# Patient Record
Sex: MALE | Race: WHITE | Employment: OTHER | URBAN - METROPOLITAN AREA
[De-identification: names, ages, dates, MRNs, and addresses within clinical notes are randomized per-mention and may not be internally consistent; named-entity substitution may affect disease eponyms.]

---

## 2019-01-27 LAB — COLONOSCOPY, EXTERNAL: NORMAL

## 2021-06-16 ENCOUNTER — HOSPITAL ENCOUNTER (OUTPATIENT)
Dept: PHYSICAL THERAPY | Age: 76
Discharge: HOME | End: 2021-06-16
Payer: COMMERCIAL

## 2021-06-16 PROCEDURE — 95992 CANALITH REPOSITIONING PROC: CPT

## 2021-06-16 PROCEDURE — 97162 PT EVAL MOD COMPLEX 30 MIN: CPT

## 2021-06-16 NOTE — PROGRESS NOTES
[address] [facility] PHYSICAL THERAPY AT [facility] [address] [place], [address] [facility] [address] [facility] [address] - Phone: [phone]  Fax: [phone] / [address] [facility]  Patient Name: Alexandr Pelaez : 5425   Medical   Diagnosis: Vertigo [R42] Treatment Diagnosis: Vertigo/ imbalance   Onset Date: Early 2021     Referral Source: Zaki Perdue* Start of Care [facility]): 2021   Prior Hospitalization: See medical history Provider #: [id number]   Prior Level of Function: Prior episode of dizziness Fall    Comorbidities: bilat TKR, Parkinson's disease, arthritis, pacemaker, hearing impaires   Medications: Verified on Patient Summary List     The Plan of Care and following information is based on the information from the initial evaluation.     ==============================================================  Assessment / key information:   Alexandr Pelaez is a 70 y.o.  yo male with Dx of Vertigo [R42]/ imbalance. He reports severe onset of dizziness with imbalance that began approx 2-3 weeks ago but has since subsided. Initial symptoms were of severe dizziness which lasted 3-4 hours. He reports residual symptoms of imbalance that lasted approx 1 week. Symptoms are reported as minimal at this time. Objectively, the patient demonstrates  the following:  Balance: Decreased static balance: SLS: R= 4 sec, L= 3 sec, ambulatory balance assessed via Functional Gait Assessment to be completed at next visit   Dizziness with ADLs: Dizziness Handicap Inventory ([address] [place] [address] = 0 points). Oculo-motor tests are WNLs     BPPV Assessment: Hallpike-[place] (right):+Hallpike[place] (left)-Roll Test (right):-Roll Test (left): -.  Pt will benefit from PT/Vestibular rehab to address these deficits, improve functional independence and reduce dizziness and imbalance with normal daily activities.  Thank you for this referral. ===============================================================  Eval Complexity: History MEDIUM  Complexity : 1-2 comorbidities / personal factors will impact the outcome/ POC ;  Examination  HIGH Complexity : 4+ Standardized tests and measures addressing body structure, function, activity limitation and / or participation in recreation ; Presentation LOW Complexity : Stable, uncomplicated ;  Decision Making MEDIUM Complexity : FOTO score of 26-74; Overall Complexity LOW   Problem List: impaired gait/ balance, decrease ADL/ functional abilitiies, decrease activity tolerance and decrease transfer abilities   Treatment Plan may include any combination of the following: Therapeutic exercise, Therapeutic activities, Neuromuscular re-education, Gait/balance training and Patient education  Patient / Family readiness to learn indicated by: asking questions, trying to perform skills and interest  Persons(s) to be included in education: patient (P)  Barriers to Learning/Limitations: None  Measures taken, if barriers to learning:    Patient Goal (s): Control of vertigo   Self reported health status: good  Rehabilitation Potential: excellent   Short/ Long Term Goals: To be accomplished in 4-6  treatments:  1. Patient will report at least 50% reduction of symptoms with ADLs. 2. Patient will be independent and complaint with HEP TID to reduce imbalance and dizziness with ADLs. 3. FGA will improve by greater that 4 points  to demonstrate reduction of dizziness and imbalance with ADLs.     Frequency / Duration:   Patient to be seen  2  times per week for 2-4  weeks:  Patient / Caregiver education and instruction: self care, activity modification and exercises    Therapist Signature: Domingo Chacon PT Date: 8/71/0370   Certification Period: 6/16/21 tp 9/18/21 Time: 1:48 PM   ==================================================================  I certify that the above Physical Therapy Services are being furnished while the patient is under my care. I agree with the treatment plan and certify that this therapy is necessary. Physician Signature:        Date:       Time:       Alexandra Salazar*  Please sign and return to [facility] or you may fax the signed copy to [phone]. Thank you.

## 2021-06-16 NOTE — PROGRESS NOTES
PHYSICAL THERAPY - DAILY TREATMENT NOTE     Patient Name: Halina Junior        Date: 2021  : 1945   YES Patient  Verified  Visit #:   1   of     Insurance: Payor: [address] / Plan: [employer] / Product Type: Managed Care Medicare /      In time: 3946 Out time: 110   Total Treatment Time: 40     Medicare/BCBS Time Tracking (below)   Total Timed Codes (min):  15 1:1 Treatment Time:  15     TREATMENT AREA =  Vertigo [R42]    SUBJECTIVE    Pain Level (on 0 to 10 scale):  0  / 10   Medication Changes/New allergies or changes in medical history, any new surgeries or procedures?     NO    If yes, update Summary List   Subjective Functional Status/Changes:  []  No changes reported     See eval /POC         OBJECTIVE  Modalities Rationale:     decrease pain to improve patient's ability to return to PLOF      min [] Estim, type/location:                                      []  att     []  unatt     []  w/US     []  w/ice    []  w/heat    min []  Mechanical Traction: type/lbs                   []  pro   []  sup   []  int   []  cont    []  before manual    []  after manual    min []  Ultrasound, settings/location:      min []  Iontophoresis w/ dexamethasone, location:                                               []  take home patch       []  in clinic    min []  Ice     []  Heat    location/position:     min []  Vasopneumatic Device, press/temp:     min []  Other:    [] Skin assessment post-treatment (if applicable):    []  intact    []  redness- no adverse reaction     []redness  adverse reaction:       min Therapeutic Exercise:  [x]  See flow sheet   Rationale:      increase ROM and increase strength to improve the patients ability to return to PLOF     15 min Manual Therapy: Technique:      [] S/DTM []IASTM []PROM [] Passive Stretching   []manual TPR    []Jt manipulation:Gr I [] II []  III [] IV[]  []REpley  Treatment Area:vestibular     Rationale: decrease pain, increase ROM, increase tissue extensibility and decrease trigger points to improve patient's ability to return to PLOF     min Neuromuscular Re-ed: [x]  See flow sheet   Rationale:      improve coordination, improve balance, increase proprioception and dec dizziness to improve the patients ability to return to PLOF        min Self Care:    Rationale:    increase ROM, increase strength and improve coordination to improve the patients ability to return to PLOF    Billed With/As:   [] TE   [] TA   [] Neuro   [] Self Care Patient Education: [x] Review HEP    [] Progressed/Changed HEP based on:   [] positioning   [] body mechanics   [] transfers   [] heat/ice application    [] other:        Other Objective/Functional Measures:    See eval/ POC     Post Treatment Pain Level (on 0 to 10) scale:   0  / 10     ASSESSMENT    X  See POC     PLAN    [x]  Upgrade activities as tolerated {YES) Continue plan of care   []  Discharge due to :    []  Other:      Therapist: Jose A Rubin PT    Date: 6/16/2021 Time: 1:42 PM     Future Appointments   Date Time Provider Madelin Nieto   6/22/2021 10:00 AM Karen Bledsoe PTA [facility]   6/25/2021 10:15 AM Monico Rhoades PT [facility]

## 2021-06-22 ENCOUNTER — HOSPITAL ENCOUNTER (OUTPATIENT)
Dept: PHYSICAL THERAPY | Age: 76
Discharge: HOME | End: 2021-06-22
Payer: COMMERCIAL

## 2021-06-22 PROCEDURE — 97112 NEUROMUSCULAR REEDUCATION: CPT

## 2021-06-22 NOTE — PROGRESS NOTES
PHYSICAL THERAPY - DAILY TREATMENT NOTE     Patient Name: Gail Robles        Date: 2021  : 1945   YES Patient  Verified  Visit #:  2  of     Insurance: Payor: Abelino Haskins / Plan: [address] PPO / Product Type: PPO /      In time: 1008 Out time: 6297   Total Treatment Time: 34     Medicare/BCBS Time Tracking (below)   Total Timed Codes (min):  34 1:1 Treatment Time:  34     TREATMENT AREA =  Vertigo [R42]    SUBJECTIVE    Pain Level (on 0 to 10 scale): 0 / 10   Medication Changes/New allergies or changes in medical history, any new surgeries or procedures? NO    If yes, update Summary List   Subjective Functional Status/Changes:  []  No changes reported       Functional improvements: no sx @ present  Functional impairments:dizziness the first 10-15 min when getting up in the morning. \"I feel like I am leaning to the left. \"         OBJECTIVE      29 min Neuromuscular Re-ed: [x]  See flow sheet   Rationale:      improve balance and increase proprioception to improve the patients ability to decrease imbalance in ADLs    5 min Manual Therapy: Technique:      [] S/DTM []IASTM []PROM [] Passive Stretching   []manual TPR    []Jt manipulation:Gr I [] II []  III [] IV[] V[]  Treatment Area:  Negative Hallpike bilaterally   Rationale:      correct positional vertigo to improve patient's ability to resolve vertigo  The manual therapy interventions were performed at a separate and distinct time from the therapeutic activities interventions.       Billed With/As:   [] TE   [] TA   [] Neuro   [] Self Care Patient Education: [x] Review HEP    [] Progressed/Changed HEP based on:   [] positioning   [] body mechanics   [] transfers   [] heat/ice application    [] other:        Other Objective/Functional Measures:    FGA:25/30  Add VSE sit, static balance EO/EC     Post Treatment Pain Level (on 0 to 10) scale:   0 / 10     ASSESSMENT    Assessment/Changes in Function:   Negative Mathew noted bilaterally   FGA baseline established  Pt verbalized good understanding of written HEP after instruction. []  See Progress Note/Recertification   Patient will continue to benefit from skilled PT services to modify and progress therapeutic interventions, address functional mobility deficits, address ROM deficits, address strength deficits, analyze and address soft tissue restrictions, analyze and cue movement patterns, address imbalance/dizziness and instruct in home and community integration to attain remaining goals.       Progress toward goals / Updated goals:    Good Progress to    [x] STG    [] LTG  1 as shown by initial HEP established per POC          PLAN    [x]  Upgrade activities as tolerated YES Continue plan of care   []  Discharge due to :    []  Other:      Therapist: Rusty Luna PTA    Date: 6/22/2021 Time: 10:42 AM     Future Appointments   Date Time Provider Madelin Nieto   6/25/2021 10:15 AM Jana Bojorquez, PT [facility]

## 2021-06-25 ENCOUNTER — HOSPITAL ENCOUNTER (OUTPATIENT)
Dept: PHYSICAL THERAPY | Age: 76
Discharge: HOME | End: 2021-06-25
Payer: COMMERCIAL

## 2021-06-25 PROCEDURE — 97112 NEUROMUSCULAR REEDUCATION: CPT

## 2021-06-25 NOTE — PROGRESS NOTES
PHYSICAL THERAPY - DAILY TREATMENT NOTE    Patient Name: Brigid Chimera        Date: 2021  : 1945    Patient  Verified: YES  Visit #:   3   of   12  Insurance: Payor: Diallo Schultz / Plan: [facility] [employer] PPO / Product Type: PPO /      In time: 1:35 Out time: 2:05   Total Treatment Time: 40     Medicare Time Tracking (below)   Total Timed Codes (min):  40 1:1 Treatment Time:  40     TREATMENT AREA/ DIAGNOSIS = Vertigo [R42]    SUBJECTIVE  Pain Level (on 0 to 10 scale):  0  / 10   Medication Changes/New allergies or changes in medical history, any new surgeries or procedures? NO    If yes, update Summary List   Subjective Functional Status/Changes:  []  No changes reported     Pt reports dizziness only in the morning for the first 5min after getting out of bed. Pt states he feels like he is leaning to the left       OBJECTIVE        40 min Neuromuscular Re-ed: [x]  See flow sheet   Rationale:    improve balance and increase proprioception to improve the patients ability to perform ADLs without pain      Billed With/As:   [x] TE   [] TA   [x] Neuro   [] Self Care Patient Education: [x] Review HEP    [] Progressed/Changed HEP based on:   [] positioning   [] body mechanics   [] transfers   [] heat/ice application    [] other:        Other Objective/Functional Measures:    Great static balance except for SLS <10secs   Post Treatment Pain Level (on 0 to 10) scale:   0  / 10     ASSESSMENT  Assessment/Changes in Function:     Pt show tremor in R hand with rest and during activity.  Pt had slight difficulty only a few times with grapevine and tandem walking      []  See Progress Note/Recertification   Patient will continue to benefit from skilled PT services to modify and progress therapeutic interventions, address functional mobility deficits, address ROM deficits, address strength deficits, analyze and address soft tissue restrictions and analyze and cue movement patterns to attain remaining goals.    Progress toward goals / Updated goals:    Good Progress to    [] STG    [x] LTG  1 as shown by no imbalance with most exercises today     PLAN  [x]  Upgrade activities as tolerated YES Continue plan of care   []  Discharge due to :    []  Other:      Therapist: Lavonne Galvin DPT     Date: 6/25/2021 Time: 2:41 PM        Future Appointments   Date Time Provider Madelin Nieto   7/1/2021 12:00 PM Mayi Morris, PT [facility]

## 2021-07-01 ENCOUNTER — HOSPITAL ENCOUNTER (OUTPATIENT)
Dept: PHYSICAL THERAPY | Age: 76
Discharge: HOME | End: 2021-07-01
Payer: COMMERCIAL

## 2021-07-01 PROCEDURE — 97112 NEUROMUSCULAR REEDUCATION: CPT

## 2021-07-01 NOTE — PROGRESS NOTES
PHYSICAL THERAPY - DAILY TREATMENT NOTE     Patient Name: Ghazala Luu        Date: 2021  : 1945   YES Patient  Verified  Visit #:   4   of   -  Insurance: Payor: Thomascandice Shelley / Plan: [address] PPO / Product Type: PPO /      In time: 1155 Out time: 1230   Total Treatment Time: 35     Medicare/BCBS Time Tracking (below)   Total Timed Codes (min):  35 1:1 Treatment Time:  35     TREATMENT AREA =  Vertigo [R42]    SUBJECTIVE    Pain Level (on 0 to 10 scale):  0  / 10   Medication Changes/New allergies or changes in medical history, any new surgeries or procedures? NO    If yes, update Summary List   Subjective Functional Status/Changes:  []  No changes reported   No recent spells of dizziness. Mornings have been good.     Balance has been pretty  Good    Overall- 75-80% improvement           OBJECTIVE        35 min Neuromuscular Re-ed: [x]  See flow sheet   Rationale:      improve coordination, improve balance, increase proprioception and dec dizziness to improve the patients ability to perform ADLs           Billed With/As:   [] TE   [] TA   [] Neuro   [] Self Care Patient Education: [x] Review HEP    [] Progressed/Changed HEP based on:   [] positioning   [] body mechanics   [] transfers   [] heat/ice application    [] other:        Other Objective/Functional Measures:    See PN/ DC   Post Treatment Pain Level (on 0 to 10) scale:   0  / 10     ASSESSMENT    [x]  See Progress Note/Recertification    PLAN    [x]  Upgrade activities as tolerated {YES) Continue plan of care   []  Discharge due to :    []  Other:      Therapist: Roxie Espino PT    Date: 2021 Time: 11:52 AM     Future Appointments   Date Time Provider Madelin Nieto   2021 12:00 PM Charan Day PT [facility]

## 2021-07-01 NOTE — PROGRESS NOTES
[address] [facility] PHYSICAL THERAPY AT [address] [facility], [address] [place] [address], [address], [address] [place] [address] - Phone: [phone]  Fax: [phone] THERAPY          Patient Name: Ramirez Field :    Treatment/Medical Diagnosis: Vertigo [R42]   Onset Date: Early 2021    Referral Source: Lora Anderson* Start of Care [facility]): 21   Prior Hospitalization: See Medical History Provider #: [id number]   Prior Level of Function: Prior episode of dizziness Fall    Comorbidities: bilat TKR, Parkinson's disease, arthritis, pacemaker, hearing impaires   Medications: Verified on Patient Summary List   Visits from [facility]: 4 Missed Visits: -     Previous Goals:  1. Patient will report at least 50% reduction of symptoms with ADLs. 2. Patient will be independent and complaint with HEP TID to reduce imbalance and dizziness with ADLs. 3. FGA will improve by greater that 4 points  to demonstrate reduction of dizziness and imbalance with ADLs. 4. Negative [place]- Hallpike test     Prior Level/Current Level:  1) Prior Level: na   Current Level: 75-80%   Goal Met? yes  2) Prior Level: na   Current Level: indep with current HEP/ self maintenance   Goal Met? yes  3) Prior Level: 25/30   Current Level: 28/30   Goal Met? yes, progressing  4) Prior Level: +   Current Level: -   Goal Met? yes    Key Functional Changes/Progress: Patient has progressed well through this course of PT. He reports 75-80% overall improvement in symptoms since onset/  FOTO score has increased from 34% to 86%, indicating significant improvement in function. He reports no recent episodes of dizziness or loss of balance    Assessments/Recommendations: Will place PT on hold for 30 days. Patient has been instructed to return to PT if symptoms worsen. He will continue with his HEP. If patient does not return to PT by 2021, he will be discharged from care.   If you have any questions/comments please contact us directly at [phone]. Thank you for allowing us to assist in the care of your patient. Therapist Signature: Sharon Solorzano PT Date: 8/0/4885   Certification Period:  Reporting Period: 6/16/21 to 9/18/21 6/16/21 to 7/1/21 Time: 12:33 PM   NOTE TO PHYSICIAN:  PLEASE COMPLETE THE ORDERS BELOW AND FAX TO   [facility] at [address]: [phone]. If you are unable to process this request in 24 hours please contact our office: [phone].    ___ I have read the above report and request that my patient continue as recommended.   ___ I have read the above report and request that my patient continue therapy with the following changes/special instructions: ________________________________________________   ___ I have read the above report and request that my patient be discharged from therapy. Physician Signature:        Date:       Time:    Sanam Hernandez*.

## 2021-08-23 NOTE — PROGRESS NOTES
[address] [facility]  [address] [facility] PHYSICAL THERAPY AT [address] [facility], [address] [place] [address] Way, [address], [address] [place] [address] - Phone: [phone]  Fax: [phone]  DISCHARGE- PHYSICAL THERAPY                   Patient Name: Ritesh Sewell : 780   Treatment/Medical Diagnosis: Vertigo [R42]   Onset Date: Early 2021     Referral Source: Leandroara Rough* Start of Care [facility]): 21   Prior Hospitalization: See Medical History Provider #: [id number]   Prior Level of Function: Prior episode of dizziness Fall    Comorbidities: bilat TKR, Parkinson's disease, arthritis, pacemaker, hearing impaires   Medications: Verified on Patient Summary List   Visits from [facility]: 4 Missed Visits: -       Previous Goals:  1. Patient will report at least 50% reduction of symptoms with ADLs. 2. Patient will be independent and complaint with HEP TID to reduce imbalance and dizziness with ADLs. 3. FGA will improve by greater that 4 points  to demonstrate reduction of dizziness and imbalance with ADLs. 4. Negative [place]- Hallpike test                    Prior Level/Current Level:  1) Prior Level: na                 Current Level: 75-80%                 Goal Met? yes  2) Prior Level: na                 Current Level: indep with current HEP/ self maintenance                 Goal Met? yes  3) Prior Level: 25/30                 Current Level: 28/30                 Goal Met? yes, progressing  4) Prior Level: +                 Current Level: -                 Goal Met? yes     Key Functional Changes/Progress: Patient has progressed well through this course of PT. He reports 75-80% overall improvement in symptoms since onset/  FOTO score has increased from 34% to 86%, indicating significant improvement in function. He reports no recent episodes of dizziness or loss of balance     Assessments/Recommendations: Patient did not return to PT during 30 day hold period.   Will discharge from PT  If you have any questions/comments please contact us directly at [phone].    Thank you for allowing us to assist in the care of your patient.     Therapist Signature: Keri Mercer, PT Date: 9/11/6972   Certification Period:  Reporting Period: 6/16/21 to 9/18/21 6/16/21 to 7/1/21 Time: 12:41 PM

## 2022-06-30 PROBLEM — I49.5 SICK SINUS SYNDROME (HCC): Status: ACTIVE | Noted: 2022-06-30

## 2022-06-30 PROBLEM — Z95.0 CARDIAC PACEMAKER IN SITU: Status: ACTIVE | Noted: 2022-06-30

## 2023-06-05 SDOH — HEALTH STABILITY: PHYSICAL HEALTH: ON AVERAGE, HOW MANY DAYS PER WEEK DO YOU ENGAGE IN MODERATE TO STRENUOUS EXERCISE (LIKE A BRISK WALK)?: 4 DAYS

## 2023-06-05 SDOH — HEALTH STABILITY: PHYSICAL HEALTH: ON AVERAGE, HOW MANY MINUTES DO YOU ENGAGE IN EXERCISE AT THIS LEVEL?: 90 MIN

## 2023-06-07 ENCOUNTER — OFFICE VISIT (OUTPATIENT)
Facility: CLINIC | Age: 78
End: 2023-06-07
Payer: COMMERCIAL

## 2023-06-07 VITALS
BODY MASS INDEX: 23.8 KG/M2 | HEART RATE: 50 BPM | HEIGHT: 71 IN | RESPIRATION RATE: 17 BRPM | TEMPERATURE: 98 F | SYSTOLIC BLOOD PRESSURE: 136 MMHG | WEIGHT: 170 LBS | OXYGEN SATURATION: 96 % | DIASTOLIC BLOOD PRESSURE: 74 MMHG

## 2023-06-07 DIAGNOSIS — K22.719 BARRETT'S ESOPHAGUS WITH DYSPLASIA: ICD-10-CM

## 2023-06-07 DIAGNOSIS — R73.03 PREDIABETES: ICD-10-CM

## 2023-06-07 DIAGNOSIS — Z13.6 SCREENING FOR AAA (ABDOMINAL AORTIC ANEURYSM): ICD-10-CM

## 2023-06-07 DIAGNOSIS — G20 PARKINSON'S DISEASE (HCC): ICD-10-CM

## 2023-06-07 DIAGNOSIS — E78.5 HYPERLIPIDEMIA, UNSPECIFIED HYPERLIPIDEMIA TYPE: ICD-10-CM

## 2023-06-07 DIAGNOSIS — I49.5 SICK SINUS SYNDROME (HCC): ICD-10-CM

## 2023-06-07 DIAGNOSIS — Z76.89 ENCOUNTER TO ESTABLISH CARE: Primary | ICD-10-CM

## 2023-06-07 DIAGNOSIS — K21.9 GASTROESOPHAGEAL REFLUX DISEASE WITHOUT ESOPHAGITIS: ICD-10-CM

## 2023-06-07 PROBLEM — G20.A1 PARKINSON'S DISEASE: Status: ACTIVE | Noted: 2021-01-25

## 2023-06-07 PROCEDURE — 99204 OFFICE O/P NEW MOD 45 MIN: CPT | Performed by: STUDENT IN AN ORGANIZED HEALTH CARE EDUCATION/TRAINING PROGRAM

## 2023-06-07 PROCEDURE — 1123F ACP DISCUSS/DSCN MKR DOCD: CPT | Performed by: STUDENT IN AN ORGANIZED HEALTH CARE EDUCATION/TRAINING PROGRAM

## 2023-06-07 RX ORDER — AMANTADINE HYDROCHLORIDE 100 MG/1
100 CAPSULE, GELATIN COATED ORAL 2 TIMES DAILY
COMMUNITY
Start: 2023-05-22

## 2023-06-07 RX ORDER — OMEPRAZOLE 40 MG/1
40 CAPSULE, DELAYED RELEASE ORAL DAILY
COMMUNITY
Start: 2023-06-02

## 2023-06-07 RX ORDER — ATORVASTATIN CALCIUM 10 MG/1
10 TABLET, FILM COATED ORAL DAILY
Qty: 90 TABLET | Refills: 3 | Status: SHIPPED | OUTPATIENT
Start: 2023-06-07

## 2023-06-07 RX ORDER — CELECOXIB 100 MG/1
100 CAPSULE ORAL DAILY
COMMUNITY
Start: 2023-05-26

## 2023-06-07 SDOH — ECONOMIC STABILITY: FOOD INSECURITY: WITHIN THE PAST 12 MONTHS, THE FOOD YOU BOUGHT JUST DIDN'T LAST AND YOU DIDN'T HAVE MONEY TO GET MORE.: NEVER TRUE

## 2023-06-07 SDOH — ECONOMIC STABILITY: FOOD INSECURITY: WITHIN THE PAST 12 MONTHS, YOU WORRIED THAT YOUR FOOD WOULD RUN OUT BEFORE YOU GOT MONEY TO BUY MORE.: NEVER TRUE

## 2023-06-07 SDOH — ECONOMIC STABILITY: INCOME INSECURITY: HOW HARD IS IT FOR YOU TO PAY FOR THE VERY BASICS LIKE FOOD, HOUSING, MEDICAL CARE, AND HEATING?: NOT HARD AT ALL

## 2023-06-07 SDOH — ECONOMIC STABILITY: HOUSING INSECURITY
IN THE LAST 12 MONTHS, WAS THERE A TIME WHEN YOU DID NOT HAVE A STEADY PLACE TO SLEEP OR SLEPT IN A SHELTER (INCLUDING NOW)?: NO

## 2023-06-07 ASSESSMENT — ENCOUNTER SYMPTOMS: SHORTNESS OF BREATH: 0

## 2023-06-07 ASSESSMENT — PATIENT HEALTH QUESTIONNAIRE - PHQ9
SUM OF ALL RESPONSES TO PHQ QUESTIONS 1-9: 0
2. FEELING DOWN, DEPRESSED OR HOPELESS: 0
1. LITTLE INTEREST OR PLEASURE IN DOING THINGS: 0
SUM OF ALL RESPONSES TO PHQ9 QUESTIONS 1 & 2: 0
SUM OF ALL RESPONSES TO PHQ QUESTIONS 1-9: 0

## 2023-06-07 NOTE — PROGRESS NOTES
Yanely Lundy is a 78 y.o. presents today for   Chief Complaint   Patient presents with    Establish Care       Is someone accompanying this pt? no    Is the patient using any DME equipment during OV? no    Depression Screening:   PHQ-9 Questionaire 6/7/2023   Little interest or pleasure in doing things 0   Feeling down, depressed, or hopeless 0   PHQ-9 Total Score 0       Abuse Screening:  No flowsheet data found. Learning Assessment:  No question data found. Fall Risk:  No flowsheet data found. Coordination of Care:   1. \"Have you been to the ER, urgent care clinic since your last visit? Hospitalized since your last visit? \" no    2. \"Have you seen or consulted any other health care providers outside of the [address] [place] [address] since your last visit? \" no    3. For patients aged 39-70: Has the patient had a colonoscopy / FIT/ Cologuard? no    If the patient is female:    4. For patients aged 41-77: Has the patient had a mammogram within the past 2 years? no    5. For patients aged 21-65: Has the patient had a pap smear? no    Health Maintenance: reviewed and discussed and ordered per Provider.     Health Maintenance Due   Topic Date Due    Lipids  Never done    Depression Screen  Never done    Hepatitis C screen  Never done    Colorectal Cancer Screen  Never done    DTaP/Tdap/Td vaccine (1 - Tdap) 08/19/2017    COVID-19 Vaccine (3 - Booster for Pfizer series) 04/27/2021
Site: Left Upper Arm   Position: Sitting   Cuff Size: Small Adult   Pulse: 50   Resp: 17   Temp: 98 °F (36.7 °C)   TempSrc: Temporal   SpO2: 96%   Weight: 170 lb (77.1 kg)   Height: 5' 11\" (1.803 m)       General: alert, oriented, not in distress  Chest/Lungs: clear breath sounds, no wheezing or crackles  Heart: normal rate, regular rhythm, no murmur  Extremities: resting tremor R>L  Skin: no active skin lesions        Assessment/Plan:    Melita Magallanes was seen today for establish care. Diagnoses and all orders for this visit:    Encounter to establish care  Parkinson's disease ([facility] [address]  -stable  -management per Neurology    Sick sinus syndrome ([facility] [address]  -management per Cardiology    Hyperlipidemia, unspecified hyperlipidemia type  -     atorvastatin (LIPITOR) 10 MG tablet; Take 1 tablet by mouth daily    Prediabetes  -discussed importance of dietary changes    Gastroesophageal reflux disease without esophagitis  Guallpa's esophagus with dysplasia  -continue PPI  -management per Gastroenterology    Screening for AAA (abdominal aortic aneurysm)  -      ABDOMINAL AORTA LIMITED; Future        On this date 6/7/2023 I have spent 30 minutes reviewing previous notes, test results and face to face with the patient discussing the diagnosis and importance of compliance with the treatment plan as well as documenting on the day of the visit. I have discussed the diagnosis with the patient and the intended plan as seen in the above orders. The patient has received an after-visit summary and questions were answered concerning future plans. I have discussed medication side effects and warnings with the patient as well. I have reviewed the plan of care with the patient, accepted their input and they are in agreement with the treatment goals. No follow-up provider specified.     Noe Hoang MD  June 7, 2023

## 2023-06-20 ENCOUNTER — HOSPITAL ENCOUNTER (OUTPATIENT)
Facility: HOSPITAL | Age: 78
Discharge: HOME | End: 2023-06-23
Attending: STUDENT IN AN ORGANIZED HEALTH CARE EDUCATION/TRAINING PROGRAM
Payer: MEDICARE

## 2023-06-20 DIAGNOSIS — Z13.6 SCREENING FOR AAA (ABDOMINAL AORTIC ANEURYSM): ICD-10-CM

## 2023-06-20 PROCEDURE — 76775 US EXAM ABDO BACK WALL LIM: CPT

## 2023-06-22 NOTE — TELEPHONE ENCOUNTER
This patient contacted the office for the following prescriptions to be refilled:    Medication requested :   Requested Prescriptions     Pending Prescriptions Disp Refills    celecoxib (CELEBREX) 100 MG capsule 60 capsule      Sig: Take 1 capsule by mouth daily    sildenafil (VIAGRA) 100 MG tablet 30 tablet      Sig: Take 0.5 tablets by mouth daily as needed      PCP: Guillermina Dakin, MD  LOV:           06/07/23 an IN OFFICE  NOV DMA: Visit date not found  FUTURE APPT: No future appointments. Thank you.

## 2023-06-23 RX ORDER — SILDENAFIL 25 MG/1
25 TABLET, FILM COATED ORAL DAILY PRN
Qty: 30 TABLET | Refills: 1 | Status: SHIPPED | OUTPATIENT
Start: 2023-06-23

## 2023-06-23 RX ORDER — CELECOXIB 100 MG/1
100 CAPSULE ORAL DAILY
Qty: 60 CAPSULE | Refills: 2 | Status: SHIPPED | OUTPATIENT
Start: 2023-06-23

## 2023-11-02 ENCOUNTER — CLINICAL DOCUMENTATION (OUTPATIENT)
Age: 78
End: 2023-11-02